# Patient Record
Sex: FEMALE | URBAN - METROPOLITAN AREA
[De-identification: names, ages, dates, MRNs, and addresses within clinical notes are randomized per-mention and may not be internally consistent; named-entity substitution may affect disease eponyms.]

---

## 2021-10-12 ENCOUNTER — APPOINTMENT (OUTPATIENT)
Dept: URBAN - METROPOLITAN AREA CLINIC 269 | Age: 66
Setting detail: DERMATOLOGY
End: 2021-10-14

## 2021-10-12 PROBLEM — C44.529 SQUAMOUS CELL CARCINOMA OF SKIN OF OTHER PART OF TRUNK: Status: ACTIVE | Noted: 2021-10-12

## 2021-10-12 PROCEDURE — 17261 DSTRJ MAL LES T/A/L .6-1.0CM: CPT

## 2021-10-12 PROCEDURE — OTHER CURETTAGE AND DESTRUCTION: OTHER

## 2021-10-12 NOTE — PROCEDURE: CURETTAGE AND DESTRUCTION
Total Volume (Ccs): 1
Render Post-Care Instructions In Note?: no
Bill As A Line Item Or As Units: Line Item
Additional Information: (Optional): The wound was cleaned, and a pressure dressing was applied.  The patient received detailed post-op instructions.
Cautery Type: electrodesiccation
Final Size Statement: The size of the lesion after curettage was
Size Of Lesion After Curettage: 0.6
Consent was obtained from the patient. The risks, benefits and alternatives to therapy were discussed in detail. Specifically, the risks of infection, scarring, bleeding, prolonged wound healing, nerve injury, incomplete removal, allergy to anesthesia and recurrence were addressed. Alternatives to ED&C, such as: surgical removal and XRT were also discussed.  Prior to the procedure, the treatment site was clearly identified and confirmed by the patient. All components of Universal Protocol/PAUSE Rule completed.
Anesthesia Type: 1% lidocaine with epinephrine
Post-Care Instructions: I reviewed with the patient in detail post-care instructions. Patient is to keep the area dry for 48 hours, and not to engage in any swimming until the area is healed. Should the patient develop any fevers, chills, bleeding, severe pain patient will contact the office immediately.
Number Of Curettages: 3
Detail Level: Detailed
Size Of Lesion In Cm: 0.4
What Was Performed First?: Curettage

## 2022-04-12 ENCOUNTER — APPOINTMENT (OUTPATIENT)
Dept: URBAN - METROPOLITAN AREA CLINIC 269 | Age: 67
Setting detail: DERMATOLOGY
End: 2022-04-12

## 2022-04-12 VITALS — WEIGHT: 172 LBS | HEIGHT: 64 IN

## 2022-04-12 DIAGNOSIS — Z85.828 PERSONAL HISTORY OF OTHER MALIGNANT NEOPLASM OF SKIN: ICD-10-CM

## 2022-04-12 DIAGNOSIS — L81.4 OTHER MELANIN HYPERPIGMENTATION: ICD-10-CM

## 2022-04-12 DIAGNOSIS — D22 MELANOCYTIC NEVI: ICD-10-CM

## 2022-04-12 DIAGNOSIS — L82.1 OTHER SEBORRHEIC KERATOSIS: ICD-10-CM

## 2022-04-12 DIAGNOSIS — D18.0 HEMANGIOMA: ICD-10-CM

## 2022-04-12 PROBLEM — D22.5 MELANOCYTIC NEVI OF TRUNK: Status: ACTIVE | Noted: 2022-04-12

## 2022-04-12 PROBLEM — D18.01 HEMANGIOMA OF SKIN AND SUBCUTANEOUS TISSUE: Status: ACTIVE | Noted: 2022-04-12

## 2022-04-12 PROCEDURE — 99213 OFFICE O/P EST LOW 20 MIN: CPT

## 2022-04-12 PROCEDURE — OTHER MIPS QUALITY: OTHER

## 2022-04-12 PROCEDURE — OTHER COUNSELING: OTHER

## 2022-04-12 PROCEDURE — OTHER REASSURANCE: OTHER

## 2022-04-12 ASSESSMENT — LOCATION DETAILED DESCRIPTION DERM
LOCATION DETAILED: EPIGASTRIC SKIN
LOCATION DETAILED: LEFT LATERAL ABDOMEN
LOCATION DETAILED: RIGHT LATERAL SUPERIOR CHEST
LOCATION DETAILED: LEFT LATERAL SUPERIOR CHEST

## 2022-04-12 ASSESSMENT — LOCATION ZONE DERM: LOCATION ZONE: TRUNK

## 2022-04-12 ASSESSMENT — LOCATION SIMPLE DESCRIPTION DERM
LOCATION SIMPLE: ABDOMEN
LOCATION SIMPLE: CHEST

## 2022-04-12 NOTE — HPI: FULL BODY SKIN EXAMINATION
What Type Of Note Output Would You Prefer (Optional)?: Bullet Format
What Is The Reason For Today's Visit?: Full Body Skin Examination
What Is The Reason For Today's Visit? (Being Monitored For X): the re-examination of lesions previously examined
Additional History: Squamous cell carcinoma was removed from chest 6 months ago. Patient states it has been doing well.

## 2022-10-18 ENCOUNTER — APPOINTMENT (OUTPATIENT)
Dept: URBAN - METROPOLITAN AREA CLINIC 268 | Age: 67
Setting detail: DERMATOLOGY
End: 2022-10-21

## 2022-10-18 VITALS — WEIGHT: 170 LBS | HEIGHT: 64 IN

## 2022-10-18 DIAGNOSIS — L57.0 ACTINIC KERATOSIS: ICD-10-CM

## 2022-10-18 PROCEDURE — 17000 DESTRUCT PREMALG LESION: CPT

## 2022-10-18 PROCEDURE — OTHER LIQUID NITROGEN: OTHER

## 2022-10-18 PROCEDURE — OTHER COUNSELING: OTHER

## 2022-10-18 PROCEDURE — OTHER MIPS QUALITY: OTHER

## 2022-10-18 ASSESSMENT — LOCATION DETAILED DESCRIPTION DERM: LOCATION DETAILED: NASAL DORSUM

## 2022-10-18 ASSESSMENT — LOCATION ZONE DERM: LOCATION ZONE: NOSE

## 2022-10-18 ASSESSMENT — LOCATION SIMPLE DESCRIPTION DERM: LOCATION SIMPLE: NOSE

## 2022-10-18 NOTE — PROCEDURE: LIQUID NITROGEN
Duration Of Freeze Thaw-Cycle (Seconds): 3
Render Note In Bullet Format When Appropriate: No
Consent: The patient's consent was obtained including but not limited to risks of crusting, scabbing, blistering, scarring, darker or lighter pigmentary change, recurrence, incomplete removal and infection.
Application Tool (Optional): Liquid Nitrogen Sprayer
Show Applicator Variable?: Yes
Detail Level: Detailed
Number Of Freeze-Thaw Cycles: 3 freeze-thaw cycles
Post-Care Instructions: I reviewed with the patient in detail post-care instructions. Patient is to wear sunprotection, and avoid picking at any of the treated lesions. Pt may apply Vaseline to crusted or scabbing areas.

## 2022-10-18 NOTE — PROCEDURE: MIPS QUALITY
Quality 226: Preventive Care And Screening: Tobacco Use: Screening And Cessation Intervention: Patient screened for tobacco use and is an ex/non-smoker
Quality 111:Pneumonia Vaccination Status For Older Adults: Pneumococcal vaccine (PPSV23) administered on or after patient’s 60th birthday and before the end of the measurement period
Quality 431: Preventive Care And Screening: Unhealthy Alcohol Use - Screening: Patient not identified as an unhealthy alcohol user when screened for unhealthy alcohol use using a systematic screening method
Detail Level: Detailed
Quality 110: Preventive Care And Screening: Influenza Immunization: Influenza Immunization Administered during Influenza season

## 2022-10-18 NOTE — HPI: SKIN LESIONS
Have Your Skin Lesions Been Treated?: not been treated
Is This A New Presentation, Or A Follow-Up?: Skin Lesion
Additional History: Spot has been present for almost 3 weeks. Patient states has been keeping it moisturized at night

## 2024-04-16 ENCOUNTER — APPOINTMENT (OUTPATIENT)
Dept: URBAN - METROPOLITAN AREA CLINIC 302 | Age: 69
Setting detail: DERMATOLOGY
End: 2024-04-16

## 2024-04-16 VITALS — WEIGHT: 180 LBS | HEIGHT: 64 IN

## 2024-04-16 DIAGNOSIS — D49.2 NEOPLASM OF UNSPECIFIED BEHAVIOR OF BONE, SOFT TISSUE, AND SKIN: ICD-10-CM

## 2024-04-16 DIAGNOSIS — L72.8 OTHER FOLLICULAR CYSTS OF THE SKIN AND SUBCUTANEOUS TISSUE: ICD-10-CM

## 2024-04-16 DIAGNOSIS — L72.0 EPIDERMAL CYST: ICD-10-CM

## 2024-04-16 PROCEDURE — OTHER MIPS QUALITY: OTHER

## 2024-04-16 PROCEDURE — 99212 OFFICE O/P EST SF 10 MIN: CPT | Mod: 25

## 2024-04-16 PROCEDURE — OTHER BIOPSY BY SHAVE METHOD: OTHER

## 2024-04-16 PROCEDURE — OTHER ADDITIONAL NOTES: OTHER

## 2024-04-16 PROCEDURE — OTHER COUNSELING: OTHER

## 2024-04-16 PROCEDURE — 11102 TANGNTL BX SKIN SINGLE LES: CPT

## 2024-04-16 ASSESSMENT — LOCATION SIMPLE DESCRIPTION DERM
LOCATION SIMPLE: RIGHT CHEEK
LOCATION SIMPLE: LEFT CHEEK

## 2024-04-16 ASSESSMENT — LOCATION DETAILED DESCRIPTION DERM
LOCATION DETAILED: RIGHT SUPERIOR CENTRAL MALAR CHEEK
LOCATION DETAILED: RIGHT SUPERIOR CENTRAL BUCCAL CHEEK
LOCATION DETAILED: LEFT INFERIOR CENTRAL MALAR CHEEK
LOCATION DETAILED: RIGHT CENTRAL MALAR CHEEK

## 2024-04-16 ASSESSMENT — LOCATION ZONE DERM: LOCATION ZONE: FACE

## 2024-04-16 NOTE — HPI: EVALUATION OF SKIN LESION(S)
How Severe Are Your Spot(S)?: mild
Have Your Spot(S) Been Treated In The Past?: has not been treated
Hpi Title: Evaluation of Skin Lesions
Additional History: Patient noticed spots after having chemotherapy

## 2024-04-16 NOTE — PROCEDURE: ADDITIONAL NOTES
Detail Level: Simple
Additional Notes: Discussed with patient that Aesthetics could remove these lesions if she would like.
Render Risk Assessment In Note?: no

## 2024-04-16 NOTE — PROCEDURE: BIOPSY BY SHAVE METHOD
Detail Level: Simple
Depth Of Biopsy: dermis
Was A Bandage Applied: Yes
Size Of Lesion In Cm: 0.5
X Size Of Lesion In Cm: 0.4
Biopsy Type: H and E
Biopsy Method: Personna blade
Anesthesia Type: 1% lidocaine with epinephrine
Additional Anesthesia Volume In Cc (Will Not Render If 0): 0
Hemostasis: Aluminum Chloride
Wound Care: Aquaphor
Dressing: bandage
Destruction After The Procedure: No
Type Of Destruction Used: Curettage
Curettage Text: The wound bed was treated with curettage after the biopsy was performed.
Cryotherapy Text: The wound bed was treated with cryotherapy after the biopsy was performed.
Electrodesiccation Text: The wound bed was treated with electrodesiccation after the biopsy was performed.
Electrodesiccation And Curettage Text: The wound bed was treated with electrodesiccation and curettage after the biopsy was performed.
Silver Nitrate Text: The wound bed was treated with silver nitrate after the biopsy was performed.
Lab: [id number]
Lab Facility: [id number]
Consent: Written consent was obtained and risks were reviewed including but not limited to scarring, infection, bleeding, scabbing, incomplete removal, nerve damage and allergy to anesthesia.
Post-Care Instructions: I reviewed with the patient in detail post-care instructions. Patient is to keep the biopsy site dry overnight, and then apply Aquaphor and a bandage daily until healed. Patient may apply warm compresses to remove any crusting.
Notification Instructions: Patient will be notified of biopsy results. However, patient instructed to call the office if not contacted within 2 weeks.
Billing Type: Third-Party Bill
Information: Selecting Yes will display possible errors in your note based on the variables you have selected. This validation is only offered as a suggestion for you. PLEASE NOTE THAT THE VALIDATION TEXT WILL BE REMOVED WHEN YOU FINALIZE YOUR NOTE. IF YOU WANT TO FAX A PRELIMINARY NOTE YOU WILL NEED TO TOGGLE THIS TO 'NO' IF YOU DO NOT WANT IT IN YOUR FAXED NOTE.

## 2025-01-21 ENCOUNTER — APPOINTMENT (OUTPATIENT)
Dept: URBAN - METROPOLITAN AREA CLINIC 302 | Age: 70
Setting detail: DERMATOLOGY
End: 2025-01-21

## 2025-01-21 VITALS — WEIGHT: 180 LBS | HEIGHT: 64 IN

## 2025-01-21 DIAGNOSIS — L57.0 ACTINIC KERATOSIS: ICD-10-CM

## 2025-01-21 PROCEDURE — OTHER LIQUID NITROGEN: OTHER

## 2025-01-21 PROCEDURE — OTHER COUNSELING: OTHER

## 2025-01-21 PROCEDURE — OTHER MIPS QUALITY: OTHER

## 2025-01-21 PROCEDURE — 17000 DESTRUCT PREMALG LESION: CPT

## 2025-01-21 PROCEDURE — 17003 DESTRUCT PREMALG LES 2-14: CPT

## 2025-01-21 ASSESSMENT — LOCATION DETAILED DESCRIPTION DERM
LOCATION DETAILED: NASAL TIP
LOCATION DETAILED: LEFT MEDIAL FOREHEAD

## 2025-01-21 ASSESSMENT — LOCATION ZONE DERM
LOCATION ZONE: NOSE
LOCATION ZONE: FACE

## 2025-01-21 ASSESSMENT — LOCATION SIMPLE DESCRIPTION DERM
LOCATION SIMPLE: NOSE
LOCATION SIMPLE: LEFT FOREHEAD

## 2025-01-21 NOTE — PROCEDURE: LIQUID NITROGEN
Detail Level: Detailed
Render Post-Care Instructions In Note?: yes
Consent: The patient's consent was obtained including but not limited to risks of crusting, scabbing, blistering, scarring, darker or lighter pigmentary change, recurrence, incomplete removal and infection.
Application Tool (Optional): Liquid Nitrogen Sprayer
Render Note In Bullet Format When Appropriate: No
Number Of Freeze-Thaw Cycles: 3 freeze-thaw cycles
Post-Care Instructions: I reviewed with the patient in detail post-care instructions. Patient is to wear sunprotection, and avoid picking at any of the treated lesions. Pt may apply Vaseline to crusted or scabbing areas.
Duration Of Freeze Thaw-Cycle (Seconds): 3

## 2025-06-12 ENCOUNTER — APPOINTMENT (OUTPATIENT)
Age: 70
Setting detail: DERMATOLOGY
End: 2025-06-12

## 2025-06-12 VITALS — WEIGHT: 179 LBS | HEIGHT: 64 IN

## 2025-06-12 DIAGNOSIS — L81.4 OTHER MELANIN HYPERPIGMENTATION: ICD-10-CM

## 2025-06-12 DIAGNOSIS — L82.1 OTHER SEBORRHEIC KERATOSIS: ICD-10-CM

## 2025-06-12 DIAGNOSIS — D49.2 NEOPLASM OF UNSPECIFIED BEHAVIOR OF BONE, SOFT TISSUE, AND SKIN: ICD-10-CM

## 2025-06-12 DIAGNOSIS — Z85.828 PERSONAL HISTORY OF OTHER MALIGNANT NEOPLASM OF SKIN: ICD-10-CM

## 2025-06-12 DIAGNOSIS — D22 MELANOCYTIC NEVI: ICD-10-CM

## 2025-06-12 DIAGNOSIS — D18.0 HEMANGIOMA: ICD-10-CM

## 2025-06-12 PROBLEM — D22.62 MELANOCYTIC NEVI OF LEFT UPPER LIMB, INCLUDING SHOULDER: Status: ACTIVE | Noted: 2025-06-12

## 2025-06-12 PROBLEM — D18.01 HEMANGIOMA OF SKIN AND SUBCUTANEOUS TISSUE: Status: ACTIVE | Noted: 2025-06-12

## 2025-06-12 PROBLEM — D22.5 MELANOCYTIC NEVI OF TRUNK: Status: ACTIVE | Noted: 2025-06-12

## 2025-06-12 PROBLEM — D22.61 MELANOCYTIC NEVI OF RIGHT UPPER LIMB, INCLUDING SHOULDER: Status: ACTIVE | Noted: 2025-06-12

## 2025-06-12 PROBLEM — D22.72 MELANOCYTIC NEVI OF LEFT LOWER LIMB, INCLUDING HIP: Status: ACTIVE | Noted: 2025-06-12

## 2025-06-12 PROBLEM — D22.71 MELANOCYTIC NEVI OF RIGHT LOWER LIMB, INCLUDING HIP: Status: ACTIVE | Noted: 2025-06-12

## 2025-06-12 PROCEDURE — OTHER REASSURANCE: OTHER

## 2025-06-12 PROCEDURE — 99213 OFFICE O/P EST LOW 20 MIN: CPT | Mod: 25

## 2025-06-12 PROCEDURE — OTHER COUNSELING: OTHER

## 2025-06-12 PROCEDURE — OTHER MIPS QUALITY: OTHER

## 2025-06-12 PROCEDURE — 11102 TANGNTL BX SKIN SINGLE LES: CPT

## 2025-06-12 PROCEDURE — 11103 TANGNTL BX SKIN EA SEP/ADDL: CPT

## 2025-06-12 PROCEDURE — OTHER BIOPSY BY SHAVE METHOD: OTHER

## 2025-06-12 ASSESSMENT — LOCATION ZONE DERM
LOCATION ZONE: ARM
LOCATION ZONE: LEG
LOCATION ZONE: TRUNK
LOCATION ZONE: FACE

## 2025-06-12 ASSESSMENT — LOCATION SIMPLE DESCRIPTION DERM
LOCATION SIMPLE: LEFT SHOULDER
LOCATION SIMPLE: ABDOMEN
LOCATION SIMPLE: RIGHT THIGH
LOCATION SIMPLE: LEFT FOREHEAD
LOCATION SIMPLE: CHEST
LOCATION SIMPLE: LEFT THIGH
LOCATION SIMPLE: LEFT UPPER ARM
LOCATION SIMPLE: RIGHT UPPER ARM

## 2025-06-12 ASSESSMENT — LOCATION DETAILED DESCRIPTION DERM
LOCATION DETAILED: RIGHT LATERAL SUPERIOR CHEST
LOCATION DETAILED: LEFT LATERAL SUPERIOR CHEST
LOCATION DETAILED: LEFT ANTERIOR DISTAL UPPER ARM
LOCATION DETAILED: LEFT ANTERIOR PROXIMAL THIGH
LOCATION DETAILED: LEFT ANTERIOR SHOULDER
LOCATION DETAILED: PERIUMBILICAL SKIN
LOCATION DETAILED: MIDDLE STERNUM
LOCATION DETAILED: RIGHT ANTERIOR PROXIMAL THIGH
LOCATION DETAILED: RIGHT ANTERIOR DISTAL UPPER ARM
LOCATION DETAILED: LEFT FOREHEAD

## 2025-07-01 ENCOUNTER — APPOINTMENT (OUTPATIENT)
Age: 70
Setting detail: DERMATOLOGY
End: 2025-07-01

## 2025-07-01 VITALS — HEIGHT: 65 IN | WEIGHT: 179 LBS

## 2025-07-01 DIAGNOSIS — L57.0 ACTINIC KERATOSIS: ICD-10-CM

## 2025-07-01 PROCEDURE — OTHER LIQUID NITROGEN: OTHER

## 2025-07-01 PROCEDURE — 17000 DESTRUCT PREMALG LESION: CPT

## 2025-07-01 PROCEDURE — OTHER MIPS QUALITY: OTHER

## 2025-07-01 PROCEDURE — OTHER COUNSELING: OTHER

## 2025-07-01 ASSESSMENT — LOCATION ZONE DERM: LOCATION ZONE: FACE

## 2025-07-01 ASSESSMENT — LOCATION DETAILED DESCRIPTION DERM: LOCATION DETAILED: LEFT FOREHEAD

## 2025-07-01 ASSESSMENT — LOCATION SIMPLE DESCRIPTION DERM: LOCATION SIMPLE: LEFT FOREHEAD
